# Patient Record
Sex: FEMALE | URBAN - METROPOLITAN AREA
[De-identification: names, ages, dates, MRNs, and addresses within clinical notes are randomized per-mention and may not be internally consistent; named-entity substitution may affect disease eponyms.]

---

## 2023-12-27 LAB
CFTR MUT ANL BLD/T: NEGATIVE
EXTERNAL ABO GROUPING: NORMAL
EXTERNAL AFP: NORMAL
EXTERNAL ANTIBODY SCREEN: NORMAL
EXTERNAL HEMOGLOBIN: 12.3 G/DL
EXTERNAL PLATELET COUNT: 241 K/ÂΜL
EXTERNAL RH FACTOR: POSITIVE
EXTERNAL RUBELLA IGG QUANTITATION: NORMAL
HCV AB SER-ACNC: NEGATIVE

## 2024-04-23 LAB
EXTERNAL HEMATOCRIT: 32.6 %
EXTERNAL HEMOGLOBIN: 10.6 G/DL
EXTERNAL SYPHILIS TOTAL IGG/IGM SCREENING: NORMAL
GLUCOSE 1H P 50 G GLC PO SERPL-MCNC: 95 MG/DL (ref 70–134)

## 2024-06-17 LAB — EXTERNAL GROUP B STREP ANTIGEN: NEGATIVE

## 2024-07-10 ENCOUNTER — HOSPITAL ENCOUNTER (OUTPATIENT)
Facility: HOSPITAL | Age: 19
Discharge: HOME | End: 2024-07-10
Attending: OBSTETRICS & GYNECOLOGY | Admitting: OBSTETRICS & GYNECOLOGY
Payer: COMMERCIAL

## 2024-07-10 VITALS
BODY MASS INDEX: 28.89 KG/M2 | WEIGHT: 157 LBS | DIASTOLIC BLOOD PRESSURE: 69 MMHG | SYSTOLIC BLOOD PRESSURE: 128 MMHG | HEART RATE: 107 BPM | HEIGHT: 62 IN

## 2024-07-10 PROBLEM — O47.1 FALSE LABOR AFTER 37 COMPLETED WEEKS OF GESTATION: Status: ACTIVE | Noted: 2024-07-10

## 2024-07-10 PROCEDURE — 99203 OFFICE O/P NEW LOW 30 MIN: CPT

## 2024-07-10 PROCEDURE — NC001 PR NO CHARGE: Performed by: OBSTETRICS & GYNECOLOGY

## 2024-07-10 RX ORDER — MULTIVITAMIN
1 TABLET ORAL DAILY
COMMUNITY

## 2024-07-10 NOTE — PROGRESS NOTES
"Triage Note - OB  Stefania Rich 18 y.o. female MRN: [id number]  Unit/Bed#: LD TRIAGE  Encounter: [id number]    Chief Complaint: pressure   HEMANT: Estimated Date of Delivery: 24    HPI: Patient is a  at 39w6d here with complaints of pelvic pressure.  Pt has received her prenatal care through [facility].  Pt was scheduled for an elective IOL today but has been put off due to acuity at the hospital.  Pt is upset because she was told she would be induced today. Pt states she has been 3 cm dilated for several weeks.  No bleeding, no leakage of fluid, + fetal movement.    ROS  Review of Systems - pertinent positives in HPI    PMH  No past medical history on file.    PSH  No past surgical history on file.    Allergies  No Known Allergies    Vitals:   Ht 5' 2\" (1.575 m)   Wt 71.2 kg (157 lb)   LMP 10/05/2023   BMI 28.72 kg/m²   Body mass index is 28.72 kg/m².    Physical Exam  GEN: AAO X 3 NA   ABD: gravid  SVE:  2-4/70/-2    FHT: 130s moderate variability cat I    TOCO: irritabliity      Labs: No results found for this or any previous visit (from the past 24 hour(s)).      A/P: 17 yo  @ 39 6/7 weeks false labor  1) No signs of active labor  2) reassurance given, return precautions reviewed;   3) Discharge instructions given to patient and labor precautions reviewed.      Marlee Park DO  OB Hospitalist  [phone]  7/10/2024  6:29 PM            "

## 2024-07-14 ENCOUNTER — HOSPITAL ENCOUNTER (INPATIENT)
Facility: HOSPITAL | Age: 19
LOS: 2 days | Discharge: HOME | End: 2024-07-16
Attending: OBSTETRICS & GYNECOLOGY | Admitting: OBSTETRICS & GYNECOLOGY
Payer: COMMERCIAL

## 2024-07-14 ENCOUNTER — ANESTHESIA (INPATIENT)
Dept: ANESTHESIOLOGY | Facility: HOSPITAL | Age: 19
End: 2024-07-14
Payer: COMMERCIAL

## 2024-07-14 ENCOUNTER — ANESTHESIA EVENT (INPATIENT)
Dept: ANESTHESIOLOGY | Facility: HOSPITAL | Age: 19
End: 2024-07-14
Payer: COMMERCIAL

## 2024-07-14 DIAGNOSIS — D50.8 IRON DEFICIENCY ANEMIA SECONDARY TO INADEQUATE DIETARY IRON INTAKE: ICD-10-CM

## 2024-07-14 PROBLEM — Z37.9 NORMAL LABOR: Status: ACTIVE | Noted: 2024-07-14

## 2024-07-14 LAB
ABO GROUP BLD: NORMAL
ABO GROUP BLD: NORMAL
ALBUMIN SERPL BCG-MCNC: 3.8 G/DL (ref 3.5–5)
ALP SERPL-CCNC: 203 U/L (ref 34–104)
ALT SERPL W P-5'-P-CCNC: 11 U/L (ref 7–52)
ANION GAP SERPL CALCULATED.3IONS-SCNC: 8 MMOL/L (ref 4–13)
AST SERPL W P-5'-P-CCNC: 26 U/L (ref 13–39)
BACTERIA UR QL AUTO: ABNORMAL /HPF
BASE EXCESS BLDCOA CALC-SCNC: -6.5 MMOL/L (ref 3–11)
BASE EXCESS BLDCOV CALC-SCNC: -4.1 MMOL/L (ref 1–9)
BASOPHILS # BLD AUTO: 0.02 THOUSANDS/ÂΜL (ref 0–0.1)
BASOPHILS NFR BLD AUTO: 0 % (ref 0–1)
BILIRUB SERPL-MCNC: 0.53 MG/DL (ref 0.2–1)
BILIRUB UR QL STRIP: NEGATIVE
BLD GP AB SCN SERPL QL: NEGATIVE
BUN SERPL-MCNC: 7 MG/DL (ref 5–25)
CALCIUM SERPL-MCNC: 9.2 MG/DL (ref 8.4–10.2)
CHLORIDE SERPL-SCNC: 106 MMOL/L (ref 96–108)
CLARITY UR: ABNORMAL
CO2 SERPL-SCNC: 21 MMOL/L (ref 21–32)
COLOR UR: YELLOW
CREAT SERPL-MCNC: 0.56 MG/DL (ref 0.6–1.3)
EOSINOPHIL # BLD AUTO: 0.03 THOUSAND/ÂΜL (ref 0–0.61)
EOSINOPHIL NFR BLD AUTO: 0 % (ref 0–6)
ERYTHROCYTE [DISTWIDTH] IN BLOOD BY AUTOMATED COUNT: 14.6 % (ref 11.6–15.1)
GFR SERPL CREATININE-BSD FRML MDRD: 134 ML/MIN/1.73SQ M
GLUCOSE SERPL-MCNC: 81 MG/DL (ref 65–140)
GLUCOSE UR STRIP-MCNC: NEGATIVE MG/DL
HCO3 BLDCOA-SCNC: 20.9 MMOL/L (ref 17.3–27.3)
HCO3 BLDCOV-SCNC: 20.5 MMOL/L (ref 12.2–28.6)
HCT VFR BLD AUTO: 34.7 % (ref 34.8–46.1)
HGB BLD-MCNC: 10.6 G/DL (ref 11.5–15.4)
HGB UR QL STRIP.AUTO: ABNORMAL
IMM GRANULOCYTES # BLD AUTO: 0.16 THOUSAND/UL (ref 0–0.2)
IMM GRANULOCYTES NFR BLD AUTO: 1 % (ref 0–2)
KETONES UR STRIP-MCNC: ABNORMAL MG/DL
LEUKOCYTE ESTERASE UR QL STRIP: NEGATIVE
LYMPHOCYTES # BLD AUTO: 1.57 THOUSANDS/ÂΜL (ref 0.6–4.47)
LYMPHOCYTES NFR BLD AUTO: 12 % (ref 14–44)
MCH RBC QN AUTO: 24.7 PG (ref 26.8–34.3)
MCHC RBC AUTO-ENTMCNC: 30.5 G/DL (ref 31.4–37.4)
MCV RBC AUTO: 81 FL (ref 82–98)
MONOCYTES # BLD AUTO: 1.41 THOUSAND/ÂΜL (ref 0.17–1.22)
MONOCYTES NFR BLD AUTO: 11 % (ref 4–12)
MUCOUS THREADS UR QL AUTO: ABNORMAL
NEUTROPHILS # BLD AUTO: 9.93 THOUSANDS/ÂΜL (ref 1.85–7.62)
NEUTS SEG NFR BLD AUTO: 76 % (ref 43–75)
NITRITE UR QL STRIP: NEGATIVE
NON-SQ EPI CELLS URNS QL MICRO: ABNORMAL /HPF
NRBC BLD AUTO-RTO: 0 /100 WBCS
O2 CT VFR BLDCOA CALC: 7.3 ML/DL
OXYHGB MFR BLDCOA: 30.9 %
OXYHGB MFR BLDCOV: 77 %
PCO2 BLDCOA: 48.3 MM[HG] (ref 30–60)
PCO2 BLDCOV: 36.6 MM HG (ref 27–43)
PH BLDCOA: 7.25 [PH] (ref 7.23–7.43)
PH BLDCOV: 7.37 [PH] (ref 7.19–7.49)
PH UR STRIP.AUTO: 6 [PH]
PLATELET # BLD AUTO: 227 THOUSANDS/UL (ref 149–390)
PMV BLD AUTO: 11 FL (ref 8.9–12.7)
PO2 BLDCOA: 17.1 MM HG (ref 5–25)
PO2 BLDCOV: 33.9 MM HG (ref 15–45)
POTASSIUM SERPL-SCNC: 3.8 MMOL/L (ref 3.5–5.3)
PROT SERPL-MCNC: 6.9 G/DL (ref 6.4–8.4)
PROT UR STRIP-MCNC: ABNORMAL MG/DL
RBC # BLD AUTO: 4.3 MILLION/UL (ref 3.81–5.12)
RBC #/AREA URNS AUTO: ABNORMAL /HPF
RH BLD: POSITIVE
RH BLD: POSITIVE
SAO2 % BLDCOV: 17.9 ML/DL
SODIUM SERPL-SCNC: 135 MMOL/L (ref 135–147)
SP GR UR STRIP.AUTO: 1.02 (ref 1–1.03)
SPECIMEN EXPIRATION DATE: NORMAL
TRANS CELLS #/AREA URNS HPF: ABNORMAL /[HPF]
UROBILINOGEN UR STRIP-ACNC: <2 MG/DL
WBC # BLD AUTO: 13.12 THOUSAND/UL (ref 4.31–10.16)
WBC #/AREA URNS AUTO: ABNORMAL /HPF

## 2024-07-14 PROCEDURE — 87086 URINE CULTURE/COLONY COUNT: CPT | Performed by: OBSTETRICS & GYNECOLOGY

## 2024-07-14 PROCEDURE — 86762 RUBELLA ANTIBODY: CPT | Performed by: OBSTETRICS & GYNECOLOGY

## 2024-07-14 PROCEDURE — 85025 COMPLETE CBC W/AUTO DIFF WBC: CPT | Performed by: OBSTETRICS & GYNECOLOGY

## 2024-07-14 PROCEDURE — 86706 HEP B SURFACE ANTIBODY: CPT | Performed by: OBSTETRICS & GYNECOLOGY

## 2024-07-14 PROCEDURE — 0UQMXZZ REPAIR VULVA, EXTERNAL APPROACH: ICD-10-PCS | Performed by: OBSTETRICS & GYNECOLOGY

## 2024-07-14 PROCEDURE — 86803 HEPATITIS C AB TEST: CPT | Performed by: OBSTETRICS & GYNECOLOGY

## 2024-07-14 PROCEDURE — 80053 COMPREHEN METABOLIC PANEL: CPT | Performed by: OBSTETRICS & GYNECOLOGY

## 2024-07-14 PROCEDURE — 87389 HIV-1 AG W/HIV-1&-2 AB AG IA: CPT | Performed by: OBSTETRICS & GYNECOLOGY

## 2024-07-14 PROCEDURE — 86780 TREPONEMA PALLIDUM: CPT | Performed by: OBSTETRICS & GYNECOLOGY

## 2024-07-14 PROCEDURE — 86850 RBC ANTIBODY SCREEN: CPT | Performed by: OBSTETRICS & GYNECOLOGY

## 2024-07-14 PROCEDURE — 86901 BLOOD TYPING SEROLOGIC RH(D): CPT | Performed by: OBSTETRICS & GYNECOLOGY

## 2024-07-14 PROCEDURE — 99212 OFFICE O/P EST SF 10 MIN: CPT

## 2024-07-14 PROCEDURE — NC001 PR NO CHARGE: Performed by: OBSTETRICS & GYNECOLOGY

## 2024-07-14 PROCEDURE — 0UQGXZZ REPAIR VAGINA, EXTERNAL APPROACH: ICD-10-PCS | Performed by: OBSTETRICS & GYNECOLOGY

## 2024-07-14 PROCEDURE — 87340 HEPATITIS B SURFACE AG IA: CPT | Performed by: OBSTETRICS & GYNECOLOGY

## 2024-07-14 PROCEDURE — 81001 URINALYSIS AUTO W/SCOPE: CPT | Performed by: OBSTETRICS & GYNECOLOGY

## 2024-07-14 PROCEDURE — 82805 BLOOD GASES W/O2 SATURATION: CPT | Performed by: OBSTETRICS & GYNECOLOGY

## 2024-07-14 PROCEDURE — 86900 BLOOD TYPING SEROLOGIC ABO: CPT | Performed by: OBSTETRICS & GYNECOLOGY

## 2024-07-14 RX ORDER — DIPHENHYDRAMINE HCL 25 MG
25 TABLET ORAL EVERY 6 HOURS PRN
Status: DISCONTINUED | OUTPATIENT
Start: 2024-07-14 | End: 2024-07-16 | Stop reason: HOSPADM

## 2024-07-14 RX ORDER — CALCIUM CARBONATE 500 MG/1
1000 TABLET, CHEWABLE ORAL DAILY PRN
Status: DISCONTINUED | OUTPATIENT
Start: 2024-07-14 | End: 2024-07-16 | Stop reason: HOSPADM

## 2024-07-14 RX ORDER — BUPIVACAINE HYDROCHLORIDE 2.5 MG/ML
30 INJECTION, SOLUTION EPIDURAL; INFILTRATION; INTRACAUDAL ONCE AS NEEDED
Status: DISCONTINUED | OUTPATIENT
Start: 2024-07-14 | End: 2024-07-14

## 2024-07-14 RX ORDER — DOCUSATE SODIUM 100 MG/1
100 CAPSULE, LIQUID FILLED ORAL 2 TIMES DAILY
Status: DISCONTINUED | OUTPATIENT
Start: 2024-07-15 | End: 2024-07-16 | Stop reason: HOSPADM

## 2024-07-14 RX ORDER — SODIUM CHLORIDE, SODIUM LACTATE, POTASSIUM CHLORIDE, CALCIUM CHLORIDE 600; 310; 30; 20 MG/100ML; MG/100ML; MG/100ML; MG/100ML
125 INJECTION, SOLUTION INTRAVENOUS CONTINUOUS
Status: DISCONTINUED | OUTPATIENT
Start: 2024-07-14 | End: 2024-07-14

## 2024-07-14 RX ORDER — ONDANSETRON 2 MG/ML
4 INJECTION INTRAMUSCULAR; INTRAVENOUS EVERY 6 HOURS PRN
Status: DISCONTINUED | OUTPATIENT
Start: 2024-07-14 | End: 2024-07-14

## 2024-07-14 RX ORDER — BENZOCAINE/MENTHOL 6 MG-10 MG
1 LOZENGE MUCOUS MEMBRANE DAILY PRN
Status: DISCONTINUED | OUTPATIENT
Start: 2024-07-14 | End: 2024-07-16 | Stop reason: HOSPADM

## 2024-07-14 RX ORDER — LIDOCAINE HYDROCHLORIDE AND EPINEPHRINE 15; 5 MG/ML; UG/ML
INJECTION, SOLUTION EPIDURAL
Status: COMPLETED | OUTPATIENT
Start: 2024-07-14 | End: 2024-07-14

## 2024-07-14 RX ORDER — CEFAZOLIN SODIUM 1 G/50ML
1000 SOLUTION INTRAVENOUS EVERY 8 HOURS
Status: DISCONTINUED | OUTPATIENT
Start: 2024-07-14 | End: 2024-07-14

## 2024-07-14 RX ORDER — ONDANSETRON 2 MG/ML
4 INJECTION INTRAMUSCULAR; INTRAVENOUS EVERY 8 HOURS PRN
Status: DISCONTINUED | OUTPATIENT
Start: 2024-07-14 | End: 2024-07-16 | Stop reason: HOSPADM

## 2024-07-14 RX ORDER — CEFAZOLIN SODIUM 2 G/50ML
2000 SOLUTION INTRAVENOUS ONCE
Status: DISCONTINUED | OUTPATIENT
Start: 2024-07-14 | End: 2024-07-14

## 2024-07-14 RX ORDER — IBUPROFEN 600 MG/1
600 TABLET ORAL EVERY 6 HOURS PRN
Status: DISCONTINUED | OUTPATIENT
Start: 2024-07-14 | End: 2024-07-16 | Stop reason: HOSPADM

## 2024-07-14 RX ORDER — OXYTOCIN/RINGER'S LACTATE 30/500 ML
PLASTIC BAG, INJECTION (ML) INTRAVENOUS
Status: COMPLETED
Start: 2024-07-14 | End: 2024-07-14

## 2024-07-14 RX ADMIN — SODIUM CHLORIDE 5 MILLION UNITS: 0.9 INJECTION, SOLUTION INTRAVENOUS at 14:59

## 2024-07-14 RX ADMIN — SODIUM CHLORIDE, SODIUM LACTATE, POTASSIUM CHLORIDE, AND CALCIUM CHLORIDE 999 ML/HR: .6; .31; .03; .02 INJECTION, SOLUTION INTRAVENOUS at 13:45

## 2024-07-14 RX ADMIN — ROPIVACAINE HYDROCHLORIDE: 2 INJECTION, SOLUTION EPIDURAL; INFILTRATION at 15:00

## 2024-07-14 RX ADMIN — AMPICILLIN SODIUM AND SULBACTAM SODIUM 3 G: 2; 1 INJECTION, POWDER, FOR SOLUTION INTRAMUSCULAR; INTRAVENOUS at 20:43

## 2024-07-14 RX ADMIN — SODIUM CHLORIDE 2.5 MILLION UNITS: 9 INJECTION, SOLUTION INTRAVENOUS at 18:17

## 2024-07-14 RX ADMIN — LIDOCAINE HYDROCHLORIDE,EPINEPHRINE BITARTRATE 5 ML: 15; .005 INJECTION, SOLUTION EPIDURAL; INFILTRATION; INTRACAUDAL; PERINEURAL at 14:39

## 2024-07-14 RX ADMIN — Medication 250 UNITS: at 23:15

## 2024-07-14 RX ADMIN — ONDANSETRON 4 MG: 2 INJECTION INTRAMUSCULAR; INTRAVENOUS at 21:16

## 2024-07-14 RX ADMIN — SODIUM CHLORIDE, SODIUM LACTATE, POTASSIUM CHLORIDE, AND CALCIUM CHLORIDE 125 ML/HR: .6; .31; .03; .02 INJECTION, SOLUTION INTRAVENOUS at 14:46

## 2024-07-14 NOTE — H&P
"History & Physical - OB/GYN   Stefania Rich 18 y.o. female MRN: [id number]  Unit/Bed#: -01 Encounter: [id number]    Assessment:   18 y.o.  at 40w3d weeks presents with labor     Plan:   1. Admit to L&D  2. Place IV and IV fluids  3. Labs: CBC, RPR, type and screen  4. Labor management:  expectant management  5. Analgesia/Epidural upon request, as appropriate.  6.  Prenatal records unavailable - draw prenatal package, GBS probe,  start PCN for unknown GBS status;  patient reports she received prenatal care at [facility] - only US reports are available through EPIC/care Everywhere               ------------------------------------------------    18 y.o. yo  at 40w3d with HEMANT of 2024, by Last Menstrual Period.    She is a patient of [facility] - just moved to [place] so presenting to UB for labor    Chief complaint:  \"I think I am in labor\"    HPI:  Contractions:  yes  Fetal movement:  yes  Vaginal bleeding:   yes  Leaking of fluid:  no      Pregnancy Complications:  Pregnancy Problems (from 07/10/24 to present)       No problems associated with this episode.            Patient states she has had prenatal care at [facility].  Records unavailable on Care Everywhere.    PMH:  History reviewed. No pertinent past medical history.    PSH:  No past surgical history on file.    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     Current Outpatient Medications on File Prior to Encounter   Medication Sig Dispense Refill    Cetirizine HCl (ZYRTEC ALLERGY PO)       Multiple Vitamin (multivitamin) tablet Take 1 tablet by mouth daily         Allergies:  No Known Allergies    Labs:  ABO Grouping " "  Date Value Ref Range Status   07/14/2024 B  Final      Rh Factor   Date Value Ref Range Status   07/14/2024 Positive  Final      Rh Factor   Date Value Ref Range Status   07/14/2024 Positive  Final     No results found for: \"HIVAGAB\"  No results found for: \"HEPBSAG\"  No results found for: \"HEPCAB\"  No results found for: \"RPR\"  No results found for: \"RUBELLAIGGQT\"  No results found for: \"FGE4TEYL18OX\"  No results found for: \"SUNGUPY3BV\"  No results found for: \"STREPGRPB\"    Physical Exam:  /80 (BP Location: Right arm)   Pulse 92   Temp 98 °F (36.7 °C) (Temporal)   Resp 17   Ht 5' 2\" (1.575 m)   Wt 71.2 kg (157 lb)   LMP 10/05/2023   SpO2 98%   BMI 28.72 kg/m²     Gen: alert, no acute distress  Cards: regular rate  Resp: unlabored breathing  Abdomen: gravid, non-tender, non-distended  Extremities: non-tender, no edema    Estimated Fetal Weight: 7 lbs  Presentation: cephalic,   SVE: Cervical Dilation: 5-6  Cervical Effacement: 100  Cervical Consistency: Soft  Fetal Station: -1  Presentation: Vertex  Position: Unknown  Method: Manual  OB Examiner: Mavis    FHT: baseline 145, accelerations present, decelerations absent, moderate variability     TOCO: q 2-4 minutes       Membranes: intact - bulging bag palpated        Patricia Gamble MD  "

## 2024-07-14 NOTE — OB LABOR/OXYTOCIN SAFETY PROGRESS
Labor Progress Note - Stefania Rich 18 y.o. female MRN: [id number]    Unit/Bed#: -01 Encounter: [id number]       Contraction Frequency (minutes): 2  Contraction Intensity: Moderate/Strong  Uterine Activity Characteristics: Regular    Cervical Dilation: 8   Cervical Effacement: 100  Fetal Station: 0    Baseline Rate (FHR): 145 bpm  Fetal Heart Rate (FHT): 149 BPM  FHR Category: 1   Cable - q 2-3 mins         Vital Signs:   Vitals:    07/14/24 1515   BP: 132/81   Pulse: 93   Resp:    Temp:    SpO2:        Notes/comments:   SROM with examination - fetal status reassuring.  Patient comfortable with epidural.      Patricia Gamble MD 7/14/2024 4:05 PM

## 2024-07-14 NOTE — ANESTHESIA PROCEDURE NOTES
Epidural Block    Patient location during procedure: OB/L&D  Start time: 7/14/2024 2:45 PM  Reason for block: procedure for pain, at surgeon's request and primary anesthetic  Staffing  Performed by: Earlene Rodriguez MD  Authorized by: Earlene Rodriguez MD    Preanesthetic Checklist  Completed: patient identified, IV checked, site marked, risks and benefits discussed, surgical consent, monitors and equipment checked, pre-op evaluation and timeout performed  Epidural  Patient position: sitting  Prep: Betadine  Sedation Level: no sedation  Patient monitoring: cardiac monitor, continuous pulse oximetry, frequent blood pressure checks and heart rate  Approach: midline  Location: lumbar, L3-4  Injection technique: MARY air  Needle  Needle type: Tuohy   Needle gauge: 17 G  Needle insertion depth: 5 cm  Catheter type: multi-orifice  Catheter size: 19 G  Catheter at skin depth: 10.5 cm  Catheter securement method: clear occlusive dressing, tape and stabilization device  Test dose: negativelidocaine-epinephrine (XYLOCAINE-MPF/EPINEPHRINE) 1.5 %-1:200,000 injection 3 mL - Epidural   5 mL - 7/14/2024 2:39:00 PM  Assessment  Sensory level: T4  Number of attempts: 1negative aspiration for CSF, negative aspiration for heme and no paresthesia on injection  patient tolerated the procedure well with no immediate complications

## 2024-07-14 NOTE — OB LABOR/OXYTOCIN SAFETY PROGRESS
Labor Progress Note - Stefania Rich 18 y.o. female MRN: [id number]    Unit/Bed#: -01 Encounter: [id number]       Contraction Frequency (minutes): 2-3  Contraction Intensity: Strong  Uterine Activity Characteristics: Regular      Cervical Dilation: 9   Cervical Effacement: 100  Fetal Station: 0  Baseline Rate (FHR): 145 bpm  Fetal Heart Rate (FHT): 149 BPM  FHR Category: 1 (scalp stim)     Vital Signs:   Vitals:    07/14/24 1820   BP: 125/82   Pulse: (!) 109   Resp:    Temp:    SpO2:        Notes/comments:   Patient very comfortable with epidural but can feel rectal pressure with digital examination;  fetal status reassuring;  recheck cervix in 2 hours or with constant rectal pressure      Patricia Gamble MD 7/14/2024 6:31 PM

## 2024-07-14 NOTE — PLAN OF CARE
Problem: ANTEPARTUM  Goal: Maintain pregnancy as long as maternal and/or fetal condition is stable  Description: INTERVENTIONS:  - Maternal surveillance  - Fetal surveillance  - Monitor uterine activity  - Medications as ordered  - Bedrest  Outcome: Progressing     Problem: BIRTH - VAGINAL/ SECTION  Goal: Fetal and maternal status remain reassuring during the birth process  Description: INTERVENTIONS:  - Monitor vital signs  - Monitor fetal heart rate  - Monitor uterine activity  - Monitor labor progression (vaginal delivery)  - DVT prophylaxis  - Antibiotic prophylaxis  Outcome: Progressing  Goal: Emotionally satisfying birthing experience for mother/fetus  Description: Interventions:  - Assess, plan, implement and evaluate the nursing care given to the patient in labor  - Advocate the philosophy that each childbirth experience is a unique experience and support the family's chosen level of involvement and control during the labor process   - Actively participate in both the patient's and family's teaching of the birth process  - Consider cultural, Tenriism and age-specific factors and plan care for the patient in labor  Outcome: Progressing     Problem: Knowledge Deficit  Goal: Verbalizes understanding of labor plan  Description: Assess patient/family/caregiver's baseline knowledge level and ability to understand information.  Provide education via patient/family/caregiver's preferred learning method at appropriate level of understanding.     1. Provide teaching at level of understanding.  2. Provide teaching via preferred learning method(s).  Outcome: Progressing     Problem: Labor & Delivery  Goal: Manages discomfort  Description: Assess and monitor for signs and symptoms of discomfort.  Assess patient's pain level regularly and per hospital policy.  Administer medications as ordered. Support use of nonpharmacological methods to help control pain such as distraction, imagery, relaxation, and  application of heat and cold.  Collaborate with interdisciplinary team and patient to determine appropriate pain management plan.    1. Include patient in decisions related to comfort.  2. Offer non-pharmacological pain management interventions.  3. Report ineffective pain management to physician.  Outcome: Progressing  Goal: Patient vital signs are stable  Description: 1. Assess vital signs - vaginal delivery.  Outcome: Progressing

## 2024-07-14 NOTE — ANESTHESIA PREPROCEDURE EVALUATION
Procedure:  LABOR ANALGESIA    Relevant Problems   No relevant active problems             Anesthesia Plan  ASA Score- 2     Anesthesia Type- epidural with ASA Monitors.         Additional Monitors:     Airway Plan:            Plan Factors-    Chart reviewed.   Existing labs reviewed. Patient summary reviewed.    Patient is not a current smoker.              Induction- intravenous.    Postoperative Plan- . Planned trial extubation    Perioperative Resuscitation Plan - Level 1 - Full Code.       Informed Consent- Anesthetic plan and risks discussed with patient.

## 2024-07-15 LAB
BASOPHILS # BLD AUTO: 0.02 THOUSANDS/ÂΜL (ref 0–0.1)
BASOPHILS NFR BLD AUTO: 0 % (ref 0–1)
EOSINOPHIL # BLD AUTO: 0 THOUSAND/ÂΜL (ref 0–0.61)
EOSINOPHIL NFR BLD AUTO: 0 % (ref 0–6)
ERYTHROCYTE [DISTWIDTH] IN BLOOD BY AUTOMATED COUNT: 15 % (ref 11.6–15.1)
HCT VFR BLD AUTO: 29.3 % (ref 34.8–46.1)
HGB BLD-MCNC: 9 G/DL (ref 11.5–15.4)
HIV 1+2 AB+HIV1 P24 AG SERPL QL IA: NORMAL
HIV 2 AB SERPL QL IA: NORMAL
HIV1 AB SERPL QL IA: NORMAL
HIV1 P24 AG SERPL QL IA: NORMAL
IMM GRANULOCYTES # BLD AUTO: 0.17 THOUSAND/UL (ref 0–0.2)
IMM GRANULOCYTES NFR BLD AUTO: 1 % (ref 0–2)
LYMPHOCYTES # BLD AUTO: 0.97 THOUSANDS/ÂΜL (ref 0.6–4.47)
LYMPHOCYTES NFR BLD AUTO: 5 % (ref 14–44)
MCH RBC QN AUTO: 25.2 PG (ref 26.8–34.3)
MCHC RBC AUTO-ENTMCNC: 30.7 G/DL (ref 31.4–37.4)
MCV RBC AUTO: 82 FL (ref 82–98)
MONOCYTES # BLD AUTO: 1.55 THOUSAND/ÂΜL (ref 0.17–1.22)
MONOCYTES NFR BLD AUTO: 8 % (ref 4–12)
NEUTROPHILS # BLD AUTO: 16.27 THOUSANDS/ÂΜL (ref 1.85–7.62)
NEUTS SEG NFR BLD AUTO: 86 % (ref 43–75)
NRBC BLD AUTO-RTO: 0 /100 WBCS
PLATELET # BLD AUTO: 192 THOUSANDS/UL (ref 149–390)
PMV BLD AUTO: 11.6 FL (ref 8.9–12.7)
RBC # BLD AUTO: 3.57 MILLION/UL (ref 3.81–5.12)
RUBV IGG SERPL IA-ACNC: 23 IU/ML
TREPONEMA PALLIDUM IGG+IGM AB [PRESENCE] IN SERUM OR PLASMA BY IMMUNOASSAY: NORMAL
TREPONEMA PALLIDUM IGG+IGM AB [PRESENCE] IN SERUM OR PLASMA BY IMMUNOASSAY: NORMAL
WBC # BLD AUTO: 18.98 THOUSAND/UL (ref 4.31–10.16)

## 2024-07-15 PROCEDURE — NC001 PR NO CHARGE: Performed by: OBSTETRICS & GYNECOLOGY

## 2024-07-15 PROCEDURE — 88307 TISSUE EXAM BY PATHOLOGIST: CPT | Performed by: PATHOLOGY

## 2024-07-15 PROCEDURE — 85025 COMPLETE CBC W/AUTO DIFF WBC: CPT | Performed by: OBSTETRICS & GYNECOLOGY

## 2024-07-15 RX ORDER — FERROUS SULFATE 325(65) MG
325 TABLET ORAL 2 TIMES DAILY WITH MEALS
Status: DISCONTINUED | OUTPATIENT
Start: 2024-07-15 | End: 2024-07-16 | Stop reason: HOSPADM

## 2024-07-15 RX ADMIN — AMPICILLIN SODIUM AND SULBACTAM SODIUM 3 G: 2; 1 INJECTION, POWDER, FOR SOLUTION INTRAMUSCULAR; INTRAVENOUS at 08:29

## 2024-07-15 RX ADMIN — DOCUSATE SODIUM 100 MG: 100 CAPSULE, LIQUID FILLED ORAL at 17:17

## 2024-07-15 RX ADMIN — DOCUSATE SODIUM 100 MG: 100 CAPSULE, LIQUID FILLED ORAL at 08:29

## 2024-07-15 RX ADMIN — BENZOCAINE AND LEVOMENTHOL 1 APPLICATION: 200; 5 SPRAY TOPICAL at 00:22

## 2024-07-15 RX ADMIN — WITCH HAZEL 1 PAD: 500 SOLUTION RECTAL; TOPICAL at 00:22

## 2024-07-15 RX ADMIN — AMPICILLIN SODIUM AND SULBACTAM SODIUM 3 G: 2; 1 INJECTION, POWDER, FOR SOLUTION INTRAMUSCULAR; INTRAVENOUS at 02:40

## 2024-07-15 RX ADMIN — FERROUS SULFATE TAB 325 MG (65 MG ELEMENTAL FE) 325 MG: 325 (65 FE) TAB at 17:17

## 2024-07-15 RX ADMIN — HYDROCORTISONE 1 APPLICATION: 0.01 CREAM TOPICAL at 00:22

## 2024-07-15 NOTE — OB LABOR/OXYTOCIN SAFETY PROGRESS
Labor Progress Note - Stefania Rich 18 y.o. female MRN: [id number]    Unit/Bed#: -01 Encounter: [id number]       Contraction Frequency (minutes): 1.5-2  Contraction Intensity: Strong  Uterine Activity Characteristics: Regular  Cervical Dilation: 10  Dilation Complete Date: 24  Dilation Complete Time:   Cervical Effacement: 100  Fetal Station: 2  Baseline Rate (FHR): 145 bpm  Fetal Heart Rate (FHT): 120 BPM  FHR Category: 1        Vital Signs:   Vitals:    24 2220   BP: 138/75   Pulse: 100   Resp:    Temp:    SpO2:        Notes/comments:     Patient has been pushing for 2 hours - head descending and now QUINTON;  safety huddle - nurse Buffy, charge nurse Tahira and myself - will continue pushing.  Anticipate     Patricia Gamble MD 2024 10:52 PM

## 2024-07-15 NOTE — PLAN OF CARE
Problem: ANTEPARTUM  Goal: Maintain pregnancy as long as maternal and/or fetal condition is stable  Description: INTERVENTIONS:  - Maternal surveillance  - Fetal surveillance  - Monitor uterine activity  - Medications as ordered  - Bedrest  Outcome: Progressing     Problem: Labor & Delivery  Goal: Manages discomfort  Description: Assess and monitor for signs and symptoms of discomfort.  Assess patient's pain level regularly and per hospital policy.  Administer medications as ordered. Support use of nonpharmacological methods to help control pain such as distraction, imagery, relaxation, and application of heat and cold.  Collaborate with interdisciplinary team and patient to determine appropriate pain management plan.    1. Include patient in decisions related to comfort.  2. Offer non-pharmacological pain management interventions.  3. Report ineffective pain management to physician.  Outcome: Progressing  Goal: Patient vital signs are stable  Description: 1. Assess vital signs - vaginal delivery.  Outcome: Progressing

## 2024-07-15 NOTE — LACTATION NOTE
"This note was copied from a baby's chart.  Met with parents to discuss feeding plan. Mother is planning to provided formula as primary nutrition.    Provided mother with WHO booklet on safe formula preparation and handout on \"How to Dry Up the Milk Supply\" that was discussed during the encounter.    Mother discussed concerns with baby feeding. Discussed exercises to help loosen jaw and help with sucking. Encouraged physical therapy outpatient.    Parents were made aware of how to communicate encouraged to reach out for continued support and/or questions that arise.  "

## 2024-07-15 NOTE — OB LABOR/OXYTOCIN SAFETY PROGRESS
Labor Progress Note - Stefania Rich 18 y.o. female MRN: [id number]    Unit/Bed#: -01 Encounter: [id number]       Contraction Frequency (minutes): 2-2.5  Contraction Intensity: Strong  Uterine Activity Characteristics: Regular  Cervical Dilation: 10  Dilation Complete Date: 07/14/24  Dilation Complete Time: 2020  Cervical Effacement: 100  Fetal Station: 1    Baseline Rate (FHR): 150 bpm  Fetal Heart Rate (FHT): 151 BPM  FHR Cateory: 1     Vital Signs:   Vitals:    07/14/24 2106   BP: 126/76   Pulse: 96   Resp:    Temp:    SpO2:        Notes/comments:   Has been pushing for almost one hour with some descent - pushing more in her face than in her rectum;  cannot feel any contractions and minimal perineal pressure;  will request anesthesia decrease pump to 8; fetal status reassuring      Patricia Gamble MD 7/14/2024 9:28 PM

## 2024-07-15 NOTE — PLAN OF CARE
Problem: ANTEPARTUM  Goal: Maintain pregnancy as long as maternal and/or fetal condition is stable  Description: INTERVENTIONS:  - Maternal surveillance  - Fetal surveillance  - Monitor uterine activity  - Medications as ordered  - Bedrest  7/15/2024 0216 by Buffy Willson RN  Outcome: Completed  7/15/2024 0026 by Buffy Willson RN  Outcome: Progressing     Problem: BIRTH - VAGINAL/ SECTION  Goal: Fetal and maternal status remain reassuring during the birth process  Description: INTERVENTIONS:  - Monitor vital signs  - Monitor fetal heart rate  - Monitor uterine activity  - Monitor labor progression (vaginal delivery)  - DVT prophylaxis  - Antibiotic prophylaxis  7/15/2024 0216 by Buffy Willson RN  Outcome: Completed  7/15/2024 0026 by Buffy Willson RN  Outcome: Progressing  Goal: Emotionally satisfying birthing experience for mother/fetus  Description: Interventions:  - Assess, plan, implement and evaluate the nursing care given to the patient in labor  - Advocate the philosophy that each childbirth experience is a unique experience and support the family's chosen level of involvement and control during the labor process   - Actively participate in both the patient's and family's teaching of the birth process  - Consider cultural, Druze and age-specific factors and plan care for the patient in labor  7/15/2024 0216 by Buffy Willson RN  Outcome: Completed  7/15/2024 0026 by Buffy Willson RN  Outcome: Progressing     Problem: POSTPARTUM  Goal: Experiences normal postpartum course  Description: INTERVENTIONS:  - Monitor maternal vital signs  - Assess uterine involution and lochia  Outcome: Progressing  Goal: Appropriate maternal -  bonding  Description: INTERVENTIONS:  - Identify family support  - Assess for appropriate maternal/infant bonding   -Encourage maternal/infant bonding opportunities  - Referral to  or  as needed  Outcome: Progressing  Goal:  Establishment of infant feeding pattern  Description: INTERVENTIONS:  - Assess breast/bottle feeding  - Refer to lactation as needed  Outcome: Progressing  Goal: Incision(s), wounds(s) or drain site(s) healing without S/S of infection  Description: INTERVENTIONS  - Assess and document dressing, incision, wound bed, drain sites and surrounding tissue  - Provide patient and family education    Outcome: Progressing     Problem: Knowledge Deficit  Goal: Verbalizes understanding of labor plan  Description: Assess patient/family/caregiver's baseline knowledge level and ability to understand information.  Provide education via patient/family/caregiver's preferred learning method at appropriate level of understanding.     1. Provide teaching at level of understanding.  2. Provide teaching via preferred learning method(s).  7/15/2024 0216 by Buffy Willson RN  Outcome: Completed  7/15/2024 0026 by Buffy Willson RN  Outcome: Progressing     Problem: Labor & Delivery  Goal: Manages discomfort  Description: Assess and monitor for signs and symptoms of discomfort.  Assess patient's pain level regularly and per hospital policy.  Administer medications as ordered. Support use of nonpharmacological methods to help control pain such as distraction, imagery, relaxation, and application of heat and cold.  Collaborate with interdisciplinary team and patient to determine appropriate pain management plan.    1. Include patient in decisions related to comfort.  2. Offer non-pharmacological pain management interventions.  3. Report ineffective pain management to physician.  7/15/2024 0216 by Buffy Willson RN  Outcome: Completed  7/15/2024 0026 by Buffy Willson RN  Outcome: Progressing  Goal: Patient vital signs are stable  Description: 1. Assess vital signs - vaginal delivery.  7/15/2024 0216 by Buffy Willson RN  Outcome: Completed  7/15/2024 0026 by Buffy Willson RN  Outcome: Progressing

## 2024-07-15 NOTE — PROGRESS NOTES
Progress Note - OB/GYN  Post-Partum Physician Note   Stefania Rich 18 y.o. female MRN: [id number]  Unit/Bed#:  206-01 Encounter: [id number]    Patient is post-op day 1 from a Spontaneous vaginal delivery.     Brief OB review:    Pain: no  Tolerating Oral Intake: yes  Voiding: yes  Ambulating: yes  Breastfeeding: Bottle feeding  Chest Pain: no  Shortness of Breath: no  Leg Pain/Discomfort: no  Lochia: minimal    Objective:   Vitals:    07/15/24 0845   BP: 107/59   Pulse: 77   Resp: 16   Temp: 97.6 °F (36.4 °C)   SpO2: 96%       Intake/Output Summary (Last 24 hours) at 7/15/2024 1043  Last data filed at 7/15/2024 0257  Gross per 24 hour   Intake 1015.65 ml   Output 2004 ml   Net -988.35 ml       Physical Exam:  General:   Lungs:  normal effort  Abdomen: abdomen is soft without significant tenderness, masses, organomegaly or guarding  Fundus: Firm and non-tender, 1 below the umbilicus  Lower extremeties: nontender      Labs/Tests:   Lab Results   Component Value Date    WBC 18.98 (H) 07/15/2024    HGB 9.0 (L) 07/15/2024    HCT 29.3 (L) 07/15/2024    MCV 82 07/15/2024     07/15/2024     MEDS:   Current Facility-Administered Medications   Medication Dose Route Frequency    benzocaine-menthol-lanolin-aloe (DERMOPLAST) 20-0.5 % topical spray 1 Application  1 Application Topical Q6H PRN    calcium carbonate (TUMS) chewable tablet 1,000 mg  1,000 mg Oral Daily PRN    diphenhydrAMINE (BENADRYL) tablet 25 mg  25 mg Oral Q6H PRN    docusate sodium (COLACE) capsule 100 mg  100 mg Oral BID    hydrocortisone 1 % cream 1 Application  1 Application Topical Daily PRN    ibuprofen (MOTRIN) tablet 600 mg  600 mg Oral Q6H PRN    ondansetron (ZOFRAN) injection 4 mg  4 mg Intravenous Q8H PRN    witch hazel-glycerin (TUCKS) topical pad 1 Pad  1 Pad Topical Q4H PRN     Invasive Devices       Peripheral Intravenous Line  Duration             Peripheral IV 07/14/24 Right Hand <1 day                    Assessment and Plan:  18 y.o.  year-old , postpartum day 1 status-post  vaginal  delivery    Continue routine postpartum care  Encourage ambulation  Oral iron for anemia

## 2024-07-15 NOTE — PLAN OF CARE
Problem: ANTEPARTUM  Goal: Maintain pregnancy as long as maternal and/or fetal condition is stable  Description: INTERVENTIONS:  - Maternal surveillance  - Fetal surveillance  - Monitor uterine activity  - Medications as ordered  - Bedrest  7/15/2024 0216 by Buffy Willson RN  Outcome: Completed  7/15/2024 0026 by Buffy Willson RN  Outcome: Progressing     Problem: BIRTH - VAGINAL/ SECTION  Goal: Fetal and maternal status remain reassuring during the birth process  Description: INTERVENTIONS:  - Monitor vital signs  - Monitor fetal heart rate  - Monitor uterine activity  - Monitor labor progression (vaginal delivery)  - DVT prophylaxis  - Antibiotic prophylaxis  7/15/2024 0216 by Buffy Willson RN  Outcome: Completed  7/15/2024 0026 by Buffy Willson RN  Outcome: Progressing  Goal: Emotionally satisfying birthing experience for mother/fetus  Description: Interventions:  - Assess, plan, implement and evaluate the nursing care given to the patient in labor  - Advocate the philosophy that each childbirth experience is a unique experience and support the family's chosen level of involvement and control during the labor process   - Actively participate in both the patient's and family's teaching of the birth process  - Consider cultural, Buddhist and age-specific factors and plan care for the patient in labor  7/15/2024 0216 by Buffy Willson RN  Outcome: Completed  7/15/2024 0026 by Buffy Willson RN  Outcome: Progressing     Problem: Knowledge Deficit  Goal: Verbalizes understanding of labor plan  Description: Assess patient/family/caregiver's baseline knowledge level and ability to understand information.  Provide education via patient/family/caregiver's preferred learning method at appropriate level of understanding.     1. Provide teaching at level of understanding.  2. Provide teaching via preferred learning method(s).  7/15/2024 0216 by Buffy Willson RN  Outcome: Completed  7/15/2024  0026 by Buffy Willson RN  Outcome: Progressing     Problem: Knowledge Deficit  Goal: Verbalizes understanding of labor plan  Description: Assess patient/family/caregiver's baseline knowledge level and ability to understand information.  Provide education via patient/family/caregiver's preferred learning method at appropriate level of understanding.     1. Provide teaching at level of understanding.  2. Provide teaching via preferred learning method(s).  7/15/2024 0216 by Buffy Willson RN  Outcome: Completed  7/15/2024 0026 by Buffy Willson RN  Outcome: Progressing     Problem: Labor & Delivery  Goal: Manages discomfort  Description: Assess and monitor for signs and symptoms of discomfort.  Assess patient's pain level regularly and per hospital policy.  Administer medications as ordered. Support use of nonpharmacological methods to help control pain such as distraction, imagery, relaxation, and application of heat and cold.  Collaborate with interdisciplinary team and patient to determine appropriate pain management plan.    1. Include patient in decisions related to comfort.  2. Offer non-pharmacological pain management interventions.  3. Report ineffective pain management to physician.  7/15/2024 0216 by Buffy Willson RN  Outcome: Completed  7/15/2024 0026 by Buffy Willson RN  Outcome: Progressing  Goal: Patient vital signs are stable  Description: 1. Assess vital signs - vaginal delivery.  7/15/2024 0216 by Buffy Willson RN  Outcome: Completed  7/15/2024 0026 by Buffy Willson RN  Outcome: Progressing

## 2024-07-15 NOTE — ANESTHESIA POSTPROCEDURE EVALUATION
Post-Op Assessment Note    CV Status:  Stable  Pain Score: 0    Pain management: adequate      Post-op block assessment: site cleaned, no complications and catheter intact   Mental Status:  Alert and awake   Hydration Status:  Stable   PONV Controlled:  None   Airway Patency:  Patent     Post Op Vitals Reviewed: Yes    No anethesia notable event occurred.    Staff: CRNA               BP      Temp      Pulse     Resp      SpO2

## 2024-07-15 NOTE — OB LABOR/OXYTOCIN SAFETY PROGRESS
Labor Progress Note - Stefania Rich 18 y.o. female MRN: [id number]    Unit/Bed#: -01 Encounter: [id number]       Contraction Frequency (minutes): 2-3  Contraction Intensity: Strong  Uterine Activity Characteristics: Regular  Cervical Dilation: 10  Dilation Complete Date: 07/14/24  Dilation Complete Time: 2020  Cervical Effacement: 100  Fetal Station: 1    Baseline Rate (FHR): 150 bpm  Fetal Heart Rate (FHT): 150 BPM  FHR Category: 2        Vital Signs:   Vitals:    07/14/24 2018   BP:    Pulse:    Resp:    Temp: 100.5 °F (38.1 °C)   SpO2:        Notes/comments:   Has received 2 doses of penicillin for unknown GBS status - now with temp 100.5 - will switch to Unasyn 3g IV q 6 hrs      Patricia Gamble MD 7/14/2024 8:20 PM

## 2024-07-15 NOTE — L&D DELIVERY NOTE
Delivery Note  Stefania Rich; 18 y.o. female; MRN: [id number]  Unit/Bed#: -01; Encounter: [id number]    Review the Delivery Report for details.     Time of delivery: 2024 11:14 PM    Preop Diagnoses:    (1) Pregnancy at 40w3d gestational age    (2) labor  (3) prenatal care at [facility]     Postop diagnoses:    (1)   female , apgars 8  / 9 , born 2024 11:14 PM    (2) midline vaginal laceration and left labial laceration - repaired    Procedure:  Delivery - Vaginal, Spontaneous    Obstetrician:  GAVIN GUNN  Assistant:  none  Anesthesia:  Epidural  QBL:  Non-Surgical QBL (mL): 504 cc  Specimens:    (1) Cord blood    (2) Cord gases    (3) Placenta    Findings:    (1)   female , apgars 8  / 9     (2) Intact placenta, cord with 3 vessels     Latest Reference Range & Units 24 23:17   pH, Cord Art 7.230 - 7.430  7.255   pCO2, Cord Art 30.0 - 60.0  48.3   pO2, Cord Art 5.0 - 25.0 mm HG 17.1   HCO3, Cord Art 17.3 - 27.3 mmol/L 20.9   Base Exc, Cord Art 3.0 - 11.0 mmol/L -6.5 (L)   PH CORD VENOUS 7.190 - 7.490  7.367   PCO2 CORD VENOUS 27.0 - 43.0 mm HG 36.6   PO2 CORD VENOUS 15.0 - 45.0 mm HG 33.9   HCO3 CORD VENOUS 12.2 - 28.6 mmol/L 20.5   BASE EXCESS CORD VENOUS 1.0 - 9.0 mmol/L -4.1 (L)   O2 CONTENT CORD VENOUS mL/dL 17.9   O2 Hgb, Arterial Cord % 30.9   O2 HGB,VENOUS CORD % 77.0        Complications:  None.  Dispo:  Stable.    Procedure in detail:    Patient is a 17yo  at 40W3D who presented to [facility] labor and delivery with c/o painful, regular contractions.  She stated her prenatal care was received at [facility] (no labs available, US report available).  She has just moved to [place] so decided to deliver at [facility].  On admission her contractions were occurring every 2-4 mins, fetal tracing category 1 and cervix dilated 5-6/100/-1 with a bulging bag noted.  She received an epidural for pain.  Due to unknown GBS status, penicillin was given.  She  developed a temperature of 100.5 during labor and antibiotic was switched to Unasyn. Her labor progressed spontaneously and she continued to complete dilation.     Complete and .  Pushed and delivered the fetal vertex Left Occiput Anterior.  With maternal expulsive effort, the anterior shoulder delivered easily.  The rest of the infant followed without difficulty.  The infant was noted to be a vigorous   baby girl.  Transferred immediately to the maternal abdomen.  After 60 seconds, the cord was clamped x 2 and cut.      Cord gases were collected.  Cord blood was collected.    Third stage of labor resulted in the Expressed delivery of the placenta, which was inspected and noted to be Intact.  The cord was noted to have 3 vessels.  The placenta was sent for pathologic examination. Uterine massage was performed, after which the uterus was noted to be firm.    The birth canal and perineum were inspected.  A midline vaginal and left labial laceration was noted, which was repaired in the usual fashion using 3-0 vicryl.  Following the repair, there was excellent hemostasis and cosmesis.  She will receive 3 more doses of Unasyn in the postpartum period.     Ms. Rich did well, and following the procedure was in stable condition.    Patricia Gamble MD  24

## 2024-07-16 VITALS
TEMPERATURE: 98.1 F | HEIGHT: 62 IN | BODY MASS INDEX: 28.89 KG/M2 | DIASTOLIC BLOOD PRESSURE: 80 MMHG | WEIGHT: 157 LBS | OXYGEN SATURATION: 97 % | RESPIRATION RATE: 16 BRPM | SYSTOLIC BLOOD PRESSURE: 120 MMHG | HEART RATE: 73 BPM

## 2024-07-16 LAB
BACTERIA UR CULT: NORMAL
HBV SURFACE AB SER-ACNC: <3 MIU/ML
HBV SURFACE AG SER QL: NORMAL
HCV AB SER QL: NORMAL

## 2024-07-16 PROCEDURE — NC001 PR NO CHARGE: Performed by: OBSTETRICS & GYNECOLOGY

## 2024-07-16 RX ORDER — FERROUS SULFATE 325(65) MG
325 TABLET ORAL 2 TIMES DAILY WITH MEALS
Qty: 60 TABLET | Refills: 0 | Status: SHIPPED | OUTPATIENT
Start: 2024-07-16 | End: 2024-08-15

## 2024-07-16 RX ORDER — IBUPROFEN 600 MG/1
600 TABLET ORAL EVERY 6 HOURS PRN
Qty: 20 TABLET | Refills: 0 | Status: SHIPPED | OUTPATIENT
Start: 2024-07-16

## 2024-07-16 RX ADMIN — DOCUSATE SODIUM 100 MG: 100 CAPSULE, LIQUID FILLED ORAL at 08:02

## 2024-07-16 RX ADMIN — FERROUS SULFATE TAB 325 MG (65 MG ELEMENTAL FE) 325 MG: 325 (65 FE) TAB at 08:02

## 2024-07-16 NOTE — UTILIZATION REVIEW
"NOTIFICATION OF INPATIENT ADMISSION   MATERNITY/DELIVERY AUTHORIZATION REQUEST   SERVICING FACILITY:   [facility]  Parent Child Health - L&D, , NICU  [address] [place] [address]  Tax ID: [id number]  NPI: [id number] ATTENDING PROVIDER:  Attending Name and NPI#: Patricia Gamble Md [[id number]]  [address] [place] [place] [address]  Phone: [phone]     ADMISSION INFORMATION:  Place of Service: Inpatient [facility]  Place of Service Code: 21  Inpatient Admission Date/Time: 24  1:30 PM  Discharge Date/Time: 2024  1:58 PM  Admitting Diagnosis Code/Description:  Abdominal pain affecting pregnancy [O26.899, R10.9]     Mother: Stefania Rich 2005 Estimated Date of Delivery: 24  Delivering clinician: Patricia Gamble   OB History          1    Para   1    Term   1            AB        Living   1         SAB        IAB        Ectopic        Multiple   0    Live Births   1               [place] Name & MRN:   Information for the patient's :  Hilario, Baby Girl (Stefaina) [[id number]]   [place] Delivery Information:  Sex: female  Delivered 2024 11:14 PM by Vaginal, Spontaneous; Gestational Age: 40w3d    [place] Measurements:  Weight: 8 lb 1.8 oz (3680 g);  Height: 19.5\"    APGAR 1 minute 5 minutes 10 minutes   Totals: 8 9       UTILIZATION REVIEW CONTACT:  Malena Kulkarni Utilization   Network Utilization Review Department  Phone: [phone]  Fax [phone]  Email: [email or web address]  Contact for approvals/pending authorizations, clinical reviews, and discharge.     PHYSICIAN ADVISORY SERVICES:  Medical Necessity Denial & [place] Review  Phone: [phone]  Fax: [phone]  Email: [email or web address]     DISCHARGE SUPPORT TEAM:  For Patients Discharge Needs & Updates  Phone: [phone] opt. 2 Fax: [phone]  Email: [email or web address]      "

## 2024-07-16 NOTE — DISCHARGE SUMMARY
Discharge Summary - OB/GYN   Stefania Rich 18 y.o. female MRN: [id number]  Unit/Bed#: -01 Encounter: [id number]    Admission Date: 2024     Discharge Date: 24    Principal Diagnosis: Abdominal pain affecting pregnancy [O26.899, R10.9],  Pregnancy at 40w3d, labor    Secondary Diagnosis: prenatal care at [facility]    Attending - Delivery: Patricia Gamble MD         - Discharge: Patricia Gamble MD    Procedures: Vaginal, Spontaneous, vaginal and labial laceration repair    Anesthesia: epidural    Complications: none apparent    Hospital Course:      Stefania Rich is a 18 y.o.  at 40w3d who was admitted for painful regular contractions.  On admission, her cervix was 5-6/100/-1. GBS prophylaxis was begun due to unknown GBS status.  She developed a fever in labor 100.5 and antibiotic was switched to Unasyn. She received an epidural for pain control and her membranes spontaneously ruptured with the next digital examination.  She progressed to complete dilation and began pushing.  2nd stage of labor was 3 hours due to fetal head direct OP.    She delivered a viable female  with weight of 8lbs, 2oz, apgars of 8 (1 min) and 9 (5 min).  She sustained a left labial and midline vaginal laceration which were repaired using 3-0 vicryl in running fashion.     was transferred to  nursery. Patient tolerated the procedure well and was transferred to recovery in stable condition.      Her postpartum course was uncomplicated.   Admission hemoglobin was 10.6g/dl, postpartum was 9.0g/dl.      On day of discharge, she was ambulating and able to reasonably perform all ADLs. She was voiding and had appropriate bowel function. Pain was well controlled. She was discharged home on postpartum day #2 without complications. Patient was instructed to follow up with her OB as an outpatient and was given appropriate warnings to call provider if she develops signs of infection or  uncontrolled pain.    Condition at discharge: good     Discharge instructions/Information to patient and family:   See after visit summary for information provided to patient and family.      Provisions for Follow-Up Care:  See after visit summary for information related to follow-up care and any pertinent home health orders.      Disposition: Home    Planned Readmission: No    Discharge Medications:  For a complete list of the patient's medications, please refer to her medication reconciliation.    Discharge Statement   I spent 20 minutes discharging the patient. This time was spent on the day of discharge. I had direct contact with the patient on the day of discharge. Additional documentation is required if more than 30 minutes were spent on discharge.     Patricia Gamble MD  OB Hospitalist on Call

## 2024-07-16 NOTE — PLAN OF CARE
Problem: POSTPARTUM  Goal: Experiences normal postpartum course  Description: INTERVENTIONS:  - Monitor maternal vital signs  - Assess uterine involution and lochia  2024 by Pamela Craft RN  Outcome: Completed  2024 by Pamela Craft RN  Outcome: Progressing  Goal: Appropriate maternal -  bonding  Description: INTERVENTIONS:  - Identify family support  - Assess for appropriate maternal/infant bonding   -Encourage maternal/infant bonding opportunities  - Referral to  or  as needed  2024 by Pamela Craft RN  Outcome: Completed  2024 by Pamela Craft RN  Outcome: Progressing  Goal: Establishment of infant feeding pattern  Description: INTERVENTIONS:  - Assess breast/bottle feeding  - Refer to lactation as needed  2024 by Pamela Craft RN  Outcome: Completed  2024 by Pamela Craft RN  Outcome: Progressing  Goal: Incision(s), wounds(s) or drain site(s) healing without S/S of infection  Description: INTERVENTIONS  - Assess and document dressing, incision, wound bed, drain sites and surrounding tissue  - Provide patient and family education    2024 by Pamela Craft RN  Outcome: Completed  2024 by Pamela Craft RN  Outcome: Progressing

## 2024-07-16 NOTE — PROGRESS NOTES
Progress Note - OB/GYN  Post-Partum Physician Note   Stefania Rich 18 y.o. female MRN: [id number]  Unit/Bed#:  206-01 Encounter: [id number]    Patient is postpartum day 2 from a Spontaneous vaginal delivery.       Pain: no  Tolerating Oral Intake: yes  Voiding: yes  Flatus: yes  Bowel Movement: no  Ambulating: yes  Breastfeeding: Breastfeeding  Chest Pain: no  Shortness of Breath: no  Leg Pain/Discomfort: no  Lochia: normal      Objective:   Vitals:    07/16/24 0730   BP: 120/80   Pulse: 73   Resp: 16   Temp: 98.1 °F (36.7 °C)   SpO2: 97%     No intake or output data in the 24 hours ending 07/16/24 1156    Physical Exam:  General: in no apparent distress, well developed and well nourished, alert, oriented times 3, and cooperative  Cardiovascular: Cor RRR  Lungs: clear to auscultation bilaterally  Abdomen: abdomen is soft without significant tenderness, masses, organomegaly or guarding  Fundus: Firm and non-tender, 1 below the umbilicus  Lower extremeties: nontender  Other: perineum inspected - left labial laceration healing well,  urethral hematoma resolved    Labs/Tests:    Latest Reference Range & Units 07/15/24 05:26   WBC 4.31 - 10.16 Thousand/uL 18.98 (H)   RBC 3.81 - 5.12 Million/uL 3.57 (L)   Hemoglobin 11.5 - 15.4 g/dL 9.0 (L)   Hematocrit 34.8 - 46.1 % 29.3 (L)   MCV 82 - 98 fL 82   MCH 26.8 - 34.3 pg 25.2 (L)   MCHC 31.4 - 37.4 g/dL 30.7 (L)   RDW 11.6 - 15.1 % 15.0   Platelet Count 149 - 390 Thousands/uL 192   MPV 8.9 - 12.7 fL 11.6   nRBC /100 WBCs 0   Segmented % 43 - 75 % 86 (H)   Lymphocytes % 14 - 44 % 5 (L)   Monocytes % 4 - 12 % 8   Eosinophils % 0 - 6 % 0   Basophils % 0 - 1 % 0   Immature Grans % 0 - 2 % 1   Absolute Immature Grans 0.00 - 0.20 Thousand/uL 0.17   Absolute Neutrophils 1.85 - 7.62 Thousands/µL 16.27 (H)   Absolute Lymphocytes 0.60 - 4.47 Thousands/µL 0.97   Absolute Monocytes 0.17 - 1.22 Thousand/µL 1.55 (H)   Absolute Eosinophils 0.00 - 0.61 Thousand/µL 0.00   Absolute  Basophils 0.00 - 0.10 Thousands/µL 0.02   (H): Data is abnormally high  (L): Data is abnormally low      MEDS:   Current Facility-Administered Medications   Medication Dose Route Frequency    benzocaine-menthol-lanolin-aloe (DERMOPLAST) 20-0.5 % topical spray 1 Application  1 Application Topical Q6H PRN    calcium carbonate (TUMS) chewable tablet 1,000 mg  1,000 mg Oral Daily PRN    diphenhydrAMINE (BENADRYL) tablet 25 mg  25 mg Oral Q6H PRN    docusate sodium (COLACE) capsule 100 mg  100 mg Oral BID    ferrous sulfate tablet 325 mg  325 mg Oral BID With Meals    hydrocortisone 1 % cream 1 Application  1 Application Topical Daily PRN    ibuprofen (MOTRIN) tablet 600 mg  600 mg Oral Q6H PRN    ondansetron (ZOFRAN) injection 4 mg  4 mg Intravenous Q8H PRN    witch hazel-glycerin (TUCKS) topical pad 1 Pad  1 Pad Topical Q4H PRN     Invasive Devices       Peripheral Intravenous Line  Duration             Peripheral IV 24 Right Hand 1 day                    Assessment and Plan:  18 y.o. year-old , postpartum day 2 status-post  spontaneous vaginal  delivery    Continue routine postpartum care  Encourage ambulation  Advance diet as tolerated  Discharge instructions reviewed including nothing in vagina x 6 weeks, no lifting anything heavier than weight of baby x 6 weeks.  Instructed to call for temp >100.5, heavy vaginal bleeding, problems with her breasts or any other concerns.  Patient instructed to followup with [facility] in 3 weeks-given contact information    Patricia Gamble MD  OB/GYN Hospitalist  [phone]  2024  11:56 AM

## 2024-08-06 ENCOUNTER — OFFICE VISIT (OUTPATIENT)
Dept: OBGYN CLINIC | Facility: CLINIC | Age: 19
End: 2024-08-06
Payer: COMMERCIAL

## 2024-08-06 VITALS
DIASTOLIC BLOOD PRESSURE: 70 MMHG | WEIGHT: 136.8 LBS | HEIGHT: 62 IN | SYSTOLIC BLOOD PRESSURE: 104 MMHG | BODY MASS INDEX: 25.17 KG/M2

## 2024-08-06 DIAGNOSIS — Z30.41 SURVEILLANCE OF CONTRACEPTIVE PILL: ICD-10-CM

## 2024-08-06 RX ORDER — DROSPIRENONE AND ETHINYL ESTRADIOL 0.02-3(28)
1 KIT ORAL DAILY
Qty: 84 TABLET | Refills: 2 | Status: SHIPPED | OUTPATIENT
Start: 2024-08-06

## 2024-08-06 NOTE — PROGRESS NOTES
"[facility] [place]  [address] [place] [id number]    Assessment/Plan:  Stefania is a 18 y.o. year old  who presents for postpartum visit.    Routine Postpartum Care  Normal postpartum exam  Contraception: OCP (estrogen/progesterone)  Depression Screen: 0  Feeding: formula  Cervical cancer screening not indicated  Follow up in: 3 months for annual exam or as needed.  Recommend avoiding intercourse for 6 weeks    Additional Problems:        Subjective:     CC: Postpartum visit    Stefania Rich is a 18 y.o. y.o. female  who presents for a postpartum visit.     She is 3 weeks postpartum following a  on 24 at 40.3 weeks. Postpartum course has been uncomplicated.     Bleeding no bleeding. Bowel function is normal. Bladder function is normal. Patient is not sexually active. She did try 3 days ago but it was too painful.     Postpartum Depression: Low Risk  (2024)    [place]  Depression Scale     Last EPDS Total Score: 0     Last EPDS Self Harm Result: Never       The following portions of the patient's history were reviewed and updated as appropriate: allergies, current medications, past family history, past medical history, obstetric history, gynecologic history, past social history, past surgical history and problem list.    Objective:  /70 (BP Location: Right arm, Patient Position: Sitting, Cuff Size: Standard)   Ht 5' 2\" (1.575 m)   Wt 62.1 kg (136 lb 12.8 oz)   LMP 10/05/2023   Breastfeeding No   BMI 25.02 kg/m²   Pregravid Weight/BMI: Pregravid weight not on file (BMI Could not be calculated)  Current Weight: 62.1 kg (136 lb 12.8 oz)   Total Weight Gain: Not found.   Pre-Tor Vitals      Flowsheet Row Most Recent Value   Prenatal Assessment    Prenatal Vitals    Blood Pressure 104/70   Weight - Scale 62.1 kg (136 lb 12.8 oz)   Urine Albumin/Glucose    Dilation/Effacement/Station    Vaginal Drainage    Edema              Chaperone present? Pt " declined.    General Appearance: alert and oriented, in no acute distress.   Abdomen: Soft, non-tender, non-distended, no masses, no rebound or guarding.  Pelvic:       External genitalia: Normal appearance, no abnormal pigmentation, no lesions or masses. Normal Bartholin's and [place]'s. Obstetric laceration well-healed.       Urinary system: Urethral meatus normal, bladder non-tender.      Vaginal: normal mucosa without prolapse or lesions. Normal-appearing physiologic discharge.  Extremities: Normal range of motion.   Skin: normal, no rash or abnormalities  Neurologic: alert, oriented x3  Psychiatric: Appropriate affect, mood stable, cooperative with exam.        Janina Brothers,   8/6/2024 9:45 AM

## 2024-08-08 DIAGNOSIS — D50.8 IRON DEFICIENCY ANEMIA SECONDARY TO INADEQUATE DIETARY IRON INTAKE: ICD-10-CM

## 2024-08-09 RX ORDER — FERROUS SULFATE 325(65) MG
TABLET ORAL
Qty: 180 TABLET | Refills: 1 | OUTPATIENT
Start: 2024-08-09

## 2024-11-13 ENCOUNTER — NURSE TRIAGE (OUTPATIENT)
Age: 19
End: 2024-11-13

## 2024-11-13 NOTE — TELEPHONE ENCOUNTER
"Patient calling in stating that she's on a birth control pill and pt missed taking 3 birth control pills. Pt stating that she \"caught back up\". Pt started taking the medication on 10/3/24. Pt stating she had her period on 10/30/24 and it ended on the 11/5/24. Pt starting having vaginal bleeding on 11/10/24 that was spotting, pt is now changing a pad every 2 hours that is saturated. Pt denies clots at this time. Pt missed 3 pills started on Friday. Pt missed pills on the 8th 9th and 10th.  Pt is unsure of pregnancy. Pt stating that there could be a chance of pregnancy. Pt also stating that she had unprotected sexual intercourse on Saturday, the same day she missed her pill. LMP 10/30/24. Pt is advised to take a pregnancy test and call back if positive, pt verbalized understanding.       Patient is advised of Home care advice. Pt was notified of having irregular or unexpected bleeding while starting new birth control within the first 3 months, pt was also notified that by missing the 3 pills, pt can also have vaginal bleeding due to missing the pills as well, pt verbalized understanding.       Pt was provided bleeding precautions of:  Please go to the nearest emergency room or call 911 if you have heavy bleeding saturating more then one pad an hour, large clots the size of a golf ball or plum, if you have excruciating abdominal pain, Pt verbalized understanding, no further questions at this time        Reason for Disposition   Irregular or unexpected bleeding or spotting    Answer Assessment - Initial Assessment Questions  1. TYPE: \"What is the name of the birth control pill you are using?\"      jasiel  2. PACK TYPE: \"How do you take your birth control pill?\"      Active pills 1-21 then placebo week   3. START DATE: \"When did you first start taking this birth control pill?\"      Pt started taking it october 3rd 4. SYMPTOM: \"What is the main symptom (or question) you're concerned about?\"      Vaginal bleeding   5. " "ONSET: \"When did the bleeding start?\"      11/10/24   6. VAGINAL BLEEDING: \"Are you having any unusual vaginal bleeding?\"      Yes, pt is changing a pad every 2 hours that is saturated. Pt denies clots at this time.   7. ABDOMEN OR PELVIC PAIN: \"Are you have any pain in your abdomen or pelvic area?\" (Scale: 0, 1-10; none, mild, moderate, severe)      Pt denies pain   8. MISSED OR LATE PILLS: \"Did you miss or take any pills late?\" If Yes, ask: \"When? How many pills?\"  Note: Pill is considered missed if taken more than 24 hours later than scheduled time.      Pt missed 3 pills started on Friday. Pt missed pills on the 8th 9th and 10th.   9. PREGNANCY: \"Are you concerned you might be pregnant?\" \"When was your last menstrual period?\"      Pt is unsure of pregnancy. Pt stating that she had unprotected sexual intercourse on Saturday, the same day she missed her pill. LMP 10/30/24    Protocols used: Contraception - Birth Control Pills - Combined-Adult-[place]    "

## 2024-11-14 NOTE — TELEPHONE ENCOUNTER
Outgoing call to patient. Informed of providers response. Patient verbalized understanding.     Patient does not want to change birth control method. RN encouraged patient to take pill at same time every day and to strive for no missed doses. Patient verbalized understanding.

## 2024-12-26 ENCOUNTER — NURSE TRIAGE (OUTPATIENT)
Dept: OTHER | Facility: OTHER | Age: 19
End: 2024-12-26

## 2024-12-26 NOTE — TELEPHONE ENCOUNTER
"It looks like vestura and lo-zumandimi are the same medication, just different brand. Drospirenone-Ethinyl Estradiol 3-0.02 MG Oral Tablet. Advised patient review the medication with the pharmacist to ensure it is the same medication. If still not comfortable after counseling with pharmacist, call us back and we send message to office that you would like specific brand of birth control dispensed. No symptoms or complaints. Pt verbalized understanding.         Reason for Disposition   Caller has medicine question only, adult not sick, AND triager answers question    Answer Assessment - Initial Assessment Questions  1. NAME of MEDICINE: \"What medicine(s) are you calling about?\"        drospirenone-ethinyl estradiol (EARL) 3-0.02 MG per tablet      2. QUESTION: \"What is your question?\" (e.g., double dose of medicine, side effect)      Last time they gave me vestura, this time they want to dispense lo-zumandimi    3. PRESCRIBER: \"Who prescribed the medicine?\" Reason: if prescribed by specialist, call should be referred to that group.      Janina DIA DO    4. SYMPTOMS: \"Do you have any symptoms?\" If Yes, ask: \"What symptoms are you having?\"  \"How bad are the symptoms (e.g., mild, moderate, severe)      no    5. PREGNANCY:  \"Is there any chance that you are pregnant?\" \"When was your last menstrual period?\"      denies    Protocols used: Medication Question Call-Adult-[place]    "

## 2025-01-07 ENCOUNTER — ANNUAL EXAM (OUTPATIENT)
Dept: OBGYN CLINIC | Facility: CLINIC | Age: 20
End: 2025-01-07
Payer: COMMERCIAL

## 2025-01-07 VITALS
WEIGHT: 130 LBS | SYSTOLIC BLOOD PRESSURE: 112 MMHG | HEIGHT: 62 IN | BODY MASS INDEX: 23.92 KG/M2 | DIASTOLIC BLOOD PRESSURE: 80 MMHG

## 2025-01-07 DIAGNOSIS — Z30.41 SURVEILLANCE OF CONTRACEPTIVE PILL: ICD-10-CM

## 2025-01-07 DIAGNOSIS — Z01.419 WELL WOMAN EXAM: Primary | ICD-10-CM

## 2025-01-07 PROCEDURE — 99395 PREV VISIT EST AGE 18-39: CPT | Performed by: OBSTETRICS & GYNECOLOGY

## 2025-01-07 RX ORDER — DROSPIRENONE AND ETHINYL ESTRADIOL 0.02-3(28)
1 KIT ORAL DAILY
Qty: 84 TABLET | Refills: 3 | Status: SHIPPED | OUTPATIENT
Start: 2025-01-07

## 2025-01-07 NOTE — PROGRESS NOTES
[facility] - [place] [id number] [place] [address]    ASSESSMENT/PLAN: Stefania Rich is a 19 y.o.  who presents for annual gynecologic exam.    Encounter for routine gynecologic examination  - Routine well woman exam completed today.  - Cervical Cancer Screening: Current ASCCP Guidelines reviewed. Last Pap: Not on file . History of abnormal: n/a  - HPV Vaccination status: Immunization series complete  - STI screening offered including HIV testing: offered, pt declined  - Contraceptive counseling discussed.  Current contraception: oral contraceptives (estrogen/progesterone), :   - The following were reviewed in today's visit: breast self exam, adequate intake of calcium and vitamin D, exercise, and healthy diet    Additional problems addressed during this visit:  1. Well woman exam  -     IGP, rfx Aptima HPV ASCU  2. Surveillance of contraceptive pill  -     drospirenone-ethinyl estradiol (EARL) 3-0.02 MG per tablet; Take 1 tablet by mouth daily      CC:  Annual Gynecologic Examination    HPI: Stefania Rich is a 19 y.o.  who presents for annual gynecologic examination. She reports no complaints. She has a monthly menses. She reports her mom was diagnosed with breast cancer in her early 40s. Unsure if mom was tested for BRCA. Will talk to her mom. If not tested, will send Stefania for genetic testing    ROS: Negative except as noted in HPI    Patient's last menstrual period was 2024.       She  reports being sexually active and has had partner(s) who are male.       The following portions of the patient's history were reviewed and updated as appropriate:   History reviewed. No pertinent past medical history.  History reviewed. No pertinent surgical history.  Family History   Problem Relation Age of Onset    Breast cancer Mother     Breast cancer Maternal Grandmother      Social History     Socioeconomic History    Marital status: Registered Domestic Partner      Spouse name: None    Number of children: None    Years of education: None    Highest education level: None   Occupational History    None   Tobacco Use    Smoking status: Never    Smokeless tobacco: Never   Vaping Use    Vaping status: Never Used   Substance and Sexual Activity    Alcohol use: Not Currently    Drug use: Never    Sexual activity: Yes     Partners: Male   Other Topics Concern    None   Social History Narrative    None     Social Drivers of Health     Financial Resource Strain: Not At Risk (11/27/2023)    Received from [facility]    Financial Resource Strain     In the last 12 months did you skip medications to save money?: No     In the last 12 months, was there a time when you needed to see a doctor but could not because of cost?: No   Food Insecurity: Not At Risk (11/27/2023)    Received from [facility]    Food Insecurity     In the last 12 months did you ever eat less than you felt you should because there wasn't enough money for food?: No   Transportation Needs: Not At Risk (11/27/2023)    Received from [facility]    Transporation     In the last 12 months, have you ever had to go without healthcare because you didn't have a way to get there?: No   Physical Activity: Insufficiently Active (2/13/2023)    Received from [facility]    Exercise Vital Sign     Days of Exercise per Week: 3 days     Minutes of Exercise per Session: 30 min   Stress: No Stress Concern Present (11/27/2023)    Received from [facility]    [employer] [place] [employer] [employer] - Occupational Stress Questionnaire     Feeling of Stress : Not at all   Social Connections: Not At Risk (11/27/2023)    Received from [facility]    Social Connections     Do you often feel lonely?: No   Intimate Partner Violence: Not on file   Housing Stability: Not At Risk (11/27/2023)    Received  "from [facility]    Housing Stability     Are you worried that in the next 2 months you may not have stable housing?: No     Outpatient Medications Marked as Taking for the 1/7/25 encounter (Annual Exam) with Janina DIA DO   Medication    drospirenone-ethinyl estradiol (EARL) 3-0.02 MG per tablet    Multiple Vitamin (multivitamin) tablet    [DISCONTINUED] drospirenone-ethinyl estradiol (EARL) 3-0.02 MG per tablet     No Known Allergies        Objective:  /80 (BP Location: Left arm, Patient Position: Sitting, Cuff Size: Adult)   Ht 5' 2\" (1.575 m)   Wt 59 kg (130 lb)   LMP 12/25/2024   Breastfeeding No   BMI 23.78 kg/m²        Chaperone present? Pt declined    General Appearance: alert and oriented, in no acute distress.   Respiratory: Unlabored breathing.  Abdomen: Soft, non-tender, non-distended, no masses, no rebound or guarding.  Breast Exam: No dimpling, nipple retraction or discharge. No lumps or masses. No axillary or supraclavicular nodes.   Pelvic:   External genitalia: Normal appearance, no abnormal pigmentation, no lesions or masses. Normal Bartholin's and Olga's.      Urinary system: Urethral meatus normal,       Bladder non-tender.      Vaginal: normal mucosa without prolapse or lesions. Normal-appearing physiologic discharge.      Cervix: Normal-appearing, well-epithelialized, no gross lesions or masses. No cervical motion tenderness.      Adnexa: No adnexal masses or tenderness noted.      Uterus: Normal-sized, regular contour, midline, mobile, no uterine tenderness.  Extremities: Normal range of motion. Warm, well-perfused, non-tender.   Skin: normal, no rash or abnormalities  Neurologic: alert, oriented x3  Psychiatric: Appropriate affect, mood stable, cooperative with exam.        Janina Brothers DO  1/7/2025 4:01 PM   "

## 2025-01-08 LAB
CYTOLOGIST CVX/VAG CYTO: NORMAL
DX ICD CODE: NORMAL
OTHER STN SPEC: NORMAL
OTHER STN SPEC: NORMAL
PATH REPORT.FINAL DX SPEC: NORMAL
SL AMB NOTE:: NORMAL
SL AMB SPECIMEN ADEQUACY: NORMAL
SL AMB TEST METHODOLOGY: NORMAL

## 2025-01-09 ENCOUNTER — RESULTS FOLLOW-UP (OUTPATIENT)
Dept: OBGYN CLINIC | Facility: CLINIC | Age: 20
End: 2025-01-09